# Patient Record
(demographics unavailable — no encounter records)

---

## 2024-10-18 NOTE — REVIEW OF SYSTEMS
Report called Maryanne CAMACHO   [Depression] : depression [Anxiety] : anxiety [Negative] : Heme/Lymph

## 2024-10-21 NOTE — PROCEDURE
[Complete Heart Block] : complete heart block [See Scanned Paceart Report] : See scanned paceart report [See Device Printout] : See device printout [CRT-D] : Cardiac resynchronization therapy defibrillator [DDDR] : DDDR [Voltage: ___ volts] : Voltage was [unfilled] volts [Longevity: ___ months] : The estimated remaining battery life is [unfilled] months [Threshold Testing Performed] : Threshold testing was performed [Sensing Amplitude ___mv] : sensing amplitude was [unfilled] mv [Lead Imp:  ___ohms] : lead impedance was [unfilled] ohms [___V @] : [unfilled] V [___ ms] : [unfilled] ms [Programmed for Longevity] : output reprogrammed for improved battery longevity [Counters Reset] : the counters were reset [de-identified] : All therapies programmed B>AX, changed to VVIR and atrial sensitivity turned OFF [VVIR] : VVIR [de-identified] : persistent AF [de-identified] : Medtronic [de-identified] : Cobalt XT Quad  [de-identified] : [id number] [de-identified] : 7/29/2021  [de-identified] : 80 [de-identified] : 4.4 years [de-identified] :  84.6% (81.4% effective CRT) Optivol below the threshold. 102 NSVT episodes lasting 1-2 seconds with HR ranging 182-286 bpm. The patient is enrolled on remote monitoring and transmitting.

## 2024-10-21 NOTE — PHYSICAL EXAM
[Normal Appearance] : normal appearance [General Appearance - In No Acute Distress] : no acute distress [] : no respiratory distress [Respiration, Rhythm And Depth] : normal respiratory rhythm and effort [Exaggerated Use Of Accessory Muscles For Inspiration] : no accessory muscle use [Left Infraclavicular] : left infraclavicular area [Clean] : clean [Dry] : dry [Well-Healed] : well-healed [Nail Clubbing] : no clubbing of the fingernails [Cyanosis, Localized] : no localized cyanosis [Petechial Hemorrhages (___cm)] : no petechial hemorrhages [FreeTextEntry1] : irregularly irregular

## 2024-10-21 NOTE — CARDIOLOGY SUMMARY
[de-identified] : 10/16/2024: A fib with RVR at 104 bpm, PVCs,  [de-identified] : ICD (MDT) implanted 7/29/2021

## 2024-10-21 NOTE — PROCEDURE
[Complete Heart Block] : complete heart block [See Scanned Paceart Report] : See scanned paceart report [See Device Printout] : See device printout [CRT-D] : Cardiac resynchronization therapy defibrillator [DDDR] : DDDR [Voltage: ___ volts] : Voltage was [unfilled] volts [Longevity: ___ months] : The estimated remaining battery life is [unfilled] months [Threshold Testing Performed] : Threshold testing was performed [Sensing Amplitude ___mv] : sensing amplitude was [unfilled] mv [Lead Imp:  ___ohms] : lead impedance was [unfilled] ohms [___V @] : [unfilled] V [___ ms] : [unfilled] ms [Programmed for Longevity] : output reprogrammed for improved battery longevity [Counters Reset] : the counters were reset [de-identified] : All therapies programmed B>AX, changed to VVIR and atrial sensitivity turned OFF [VVIR] : VVIR [de-identified] : persistent AF [de-identified] : Medtronic [de-identified] : Cobalt XT Quad  [de-identified] : [id number] [de-identified] : 7/29/2021  [de-identified] : 80 [de-identified] : 4.4 years [de-identified] :  84.6% (81.4% effective CRT) Optivol below the threshold. 102 NSVT episodes lasting 1-2 seconds with HR ranging 182-286 bpm. The patient is enrolled on remote monitoring and transmitting.

## 2024-10-21 NOTE — HISTORY OF PRESENT ILLNESS
[Palpitations] : no palpitations [SOB] : no dyspnea [Syncope] : no syncope [Dizziness] : no dizziness [Chest Pain] : no chest pain or discomfort [ICD Shocks] : no recent ICD shocks [Shoulder Pain] : no shoulder pain [Pain at Site] : no pain at device site [Erythema at Site] : no erythema at device site [Swelling at Site] : no swelling at device site [de-identified] : hypertension, DM, obesity, nonischemic cardiomyopathy chronic systolic heart failure NYHA III HIV, asthma s/p ICD implant by Dr. Carvalho in [place] in 2017 prior asthma reaction to beta-blockers chronic atrial fibrillation on warfarin, ( Jantoven) BiV upgrade and AV edmund ablation on 7/29/2021 10/22/2021: VT treated with ATP 11/14/2022: November 2021: VT that degenerated to VF terminated with ICD shock. (Mexiletine added) 2/19/2022: VT treated with ATP 3/14/2022: feet swelling; optivol accumulation on device check 4/14/2022 to 8/7/2022: no ICD shocks; 6 episodes of VT treated with ATP x1 9/12/2022: hospitalized at [facility] in July for fluid overload; seeing heart failure at [facility]; being evaluated from transplant and LVAD; had RHC 3/30/2023: hospitalized at [facility] for diarrhea - seeing Dr. Torres and Dr. Ashby 6/12/2023: No events - Last ATP 9/22/2022 10/12/2023: no events on ICD 4/15/2024: 1 VT event treated with ATP on 2/14/2024. No associated symptoms.  10/16/2024:  The patient denies chest pain/discomfort, dyspnea, palpitations, dizziness, lightheadedness and syncope.  She presents today for routine device interrogation.   ----------------------------------------------------------- Referring Cardiologist: Dr. German Torres Referring Heart Failure: Dr. Kaia Bravo Heart Failure [place]: Dr. Fang Bowers -----------------------------------------------------------   CARDIAC TESTING: ECG (6/23/2023) Atrial Fibrillation, BiV paced at 81 bpm ECG (9/12/2022) Atrial Fibrillation, BiV paced at 85 bpm ECG (3/14/2022): Atrial paced BiV paced at 83 bpm ECG (12/13/2021): Atrial paced BiV paced at 80 bpm ECG (4/19/2021): Atrial Fibrillation at 83 bpm, q inferior leads, precordial qs ECG ( 1/4/2021) no ecg done ECG ( 4/1/2019) atrial fib at 91bpm  QRS 94ms , poor R wave progression ECG (10/1//2018): Atrial fibrillation at 84 bpm, QRS 96, poor R wave progression, q waves inferior and anteroseptal leads. ECG (4/30/2018): Atrial fibrillation at 71 bpm, QRS 98, poor R wave progression, q waves inferior and anteroseptal leads. ECG (10/30/2017): Atrial fibrillation at 81 bpm, QRS 98, poor R wave progression, q waves inferior and anteroseptal leads. ECG (7/24/2017): Atrial fibrillation at 58 bpm, PVC, poor R wave progression, q waves inferior and anteroseptal leads. Echo (12/6/2016): LV moderately dialted, mild LVH, LVEF 20-25%, Grade 2 diastolic, LA dilated, Mod AI Cardiac Cath (6/15/2016): normal coronaries, mod elevated LVEDP Cardiac MRI (10/14/2016) LVEF 18%, RVEF 24%, moderate AI, mild MR

## 2024-10-21 NOTE — CARDIOLOGY SUMMARY
[de-identified] : 10/16/2024: A fib with RVR at 104 bpm, PVCs,  [de-identified] : ICD (MDT) implanted 7/29/2021

## 2024-10-21 NOTE — HISTORY OF PRESENT ILLNESS
[Palpitations] : no palpitations [SOB] : no dyspnea [Syncope] : no syncope [Dizziness] : no dizziness [Chest Pain] : no chest pain or discomfort [ICD Shocks] : no recent ICD shocks [Shoulder Pain] : no shoulder pain [Pain at Site] : no pain at device site [Erythema at Site] : no erythema at device site [Swelling at Site] : no swelling at device site [de-identified] : hypertension, DM, obesity, nonischemic cardiomyopathy chronic systolic heart failure NYHA III HIV, asthma s/p ICD implant by Dr. Carvalho in [place] in 2017 prior asthma reaction to beta-blockers chronic atrial fibrillation on warfarin, ( Jantoven) BiV upgrade and AV edmund ablation on 7/29/2021 10/22/2021: VT treated with ATP 11/14/2022: November 2021: VT that degenerated to VF terminated with ICD shock. (Mexiletine added) 2/19/2022: VT treated with ATP 3/14/2022: feet swelling; optivol accumulation on device check 4/14/2022 to 8/7/2022: no ICD shocks; 6 episodes of VT treated with ATP x1 9/12/2022: hospitalized at [facility] in July for fluid overload; seeing heart failure at [facility]; being evaluated from transplant and LVAD; had RHC 3/30/2023: hospitalized at [facility] for diarrhea - seeing Dr. Torres and Dr. Ashby 6/12/2023: No events - Last ATP 9/22/2022 10/12/2023: no events on ICD 4/15/2024: 1 VT event treated with ATP on 2/14/2024. No associated symptoms.  10/16/2024:  The patient denies chest pain/discomfort, dyspnea, palpitations, dizziness, lightheadedness and syncope.  She presents today for routine device interrogation.   ----------------------------------------------------------- Referring Cardiologist: Dr. German Torres Referring Heart Failure: Dr. Kaia Bravo Heart Failure [facility]: Dr. Fang Bowers -----------------------------------------------------------   CARDIAC TESTING: ECG (6/23/2023) Atrial Fibrillation, BiV paced at 81 bpm ECG (9/12/2022) Atrial Fibrillation, BiV paced at 85 bpm ECG (3/14/2022): Atrial paced BiV paced at 83 bpm ECG (12/13/2021): Atrial paced BiV paced at 80 bpm ECG (4/19/2021): Atrial Fibrillation at 83 bpm, q inferior leads, precordial qs ECG ( 1/4/2021) no ecg done ECG ( 4/1/2019) atrial fib at 91bpm  QRS 94ms , poor R wave progression ECG (10/1//2018): Atrial fibrillation at 84 bpm, QRS 96, poor R wave progression, q waves inferior and anteroseptal leads. ECG (4/30/2018): Atrial fibrillation at 71 bpm, QRS 98, poor R wave progression, q waves inferior and anteroseptal leads. ECG (10/30/2017): Atrial fibrillation at 81 bpm, QRS 98, poor R wave progression, q waves inferior and anteroseptal leads. ECG (7/24/2017): Atrial fibrillation at 58 bpm, PVC, poor R wave progression, q waves inferior and anteroseptal leads. Echo (12/6/2016): LV moderately dialted, mild LVH, LVEF 20-25%, Grade 2 diastolic, LA dilated, Mod AI Cardiac Cath (6/15/2016): normal coronaries, mod elevated LVEDP Cardiac MRI (10/14/2016) LVEF 18%, RVEF 24%, moderate AI, mild MR

## 2024-10-21 NOTE — HISTORY OF PRESENT ILLNESS
[Palpitations] : no palpitations [SOB] : no dyspnea [Syncope] : no syncope [Dizziness] : no dizziness [Chest Pain] : no chest pain or discomfort [ICD Shocks] : no recent ICD shocks [Shoulder Pain] : no shoulder pain [Pain at Site] : no pain at device site [Erythema at Site] : no erythema at device site [Swelling at Site] : no swelling at device site [de-identified] : hypertension, DM, obesity, nonischemic cardiomyopathy chronic systolic heart failure NYHA III HIV, asthma s/p ICD implant by Dr. Carvalho in [place] in 2017 prior asthma reaction to beta-blockers chronic atrial fibrillation on warfarin, ( Jantoven) BiV upgrade and AV edmund ablation on 7/29/2021 10/22/2021: VT treated with ATP 11/14/2022: November 2021: VT that degenerated to VF terminated with ICD shock. (Mexiletine added) 2/19/2022: VT treated with ATP 3/14/2022: feet swelling; optivol accumulation on device check 4/14/2022 to 8/7/2022: no ICD shocks; 6 episodes of VT treated with ATP x1 9/12/2022: hospitalized at [facility] in July for fluid overload; seeing heart failure at [facility]; being evaluated from transplant and LVAD; had RHC 3/30/2023: hospitalized at [facility] for diarrhea - seeing Dr. Torres and Dr. Ashby 6/12/2023: No events - Last ATP 9/22/2022 10/12/2023: no events on ICD 4/15/2024: 1 VT event treated with ATP on 2/14/2024. No associated symptoms.  10/16/2024:  The patient denies chest pain/discomfort, dyspnea, palpitations, dizziness, lightheadedness and syncope.  She presents today for routine device interrogation.   ----------------------------------------------------------- Referring Cardiologist: Dr. German Torres Referring Heart Failure: Dr. Kaia Bravo Heart Failure [facility]: Dr. Fang Bowers -----------------------------------------------------------   CARDIAC TESTING: ECG (6/23/2023) Atrial Fibrillation, BiV paced at 81 bpm ECG (9/12/2022) Atrial Fibrillation, BiV paced at 85 bpm ECG (3/14/2022): Atrial paced BiV paced at 83 bpm ECG (12/13/2021): Atrial paced BiV paced at 80 bpm ECG (4/19/2021): Atrial Fibrillation at 83 bpm, q inferior leads, precordial qs ECG ( 1/4/2021) no ecg done ECG ( 4/1/2019) atrial fib at 91bpm  QRS 94ms , poor R wave progression ECG (10/1//2018): Atrial fibrillation at 84 bpm, QRS 96, poor R wave progression, q waves inferior and anteroseptal leads. ECG (4/30/2018): Atrial fibrillation at 71 bpm, QRS 98, poor R wave progression, q waves inferior and anteroseptal leads. ECG (10/30/2017): Atrial fibrillation at 81 bpm, QRS 98, poor R wave progression, q waves inferior and anteroseptal leads. ECG (7/24/2017): Atrial fibrillation at 58 bpm, PVC, poor R wave progression, q waves inferior and anteroseptal leads. Echo (12/6/2016): LV moderately dialted, mild LVH, LVEF 20-25%, Grade 2 diastolic, LA dilated, Mod AI Cardiac Cath (6/15/2016): normal coronaries, mod elevated LVEDP Cardiac MRI (10/14/2016) LVEF 18%, RVEF 24%, moderate AI, mild MR

## 2024-10-21 NOTE — DISCUSSION/SUMMARY
[FreeTextEntry1] : Ms. 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 The treatment options for atrial fibrillation were discussed with the patient including catheter ablation of AF. Patient is not interested in AF ablation. Patient is allergic to betablockers and had severe reaction to multiple different betablockers. She was on Digoxin until June 2021, stopped due to polymorphic VT. Patient is on Cardizem for rate control, which is suboptimal in view of her low EF. She opted to have upgrade to BiV-ICD and AV edmund ablation for rate control, to avoid ICD shocks, and to allow stopping Cardizem and Digoxin. She underwent on 7/29/2021 an upgrade to biventricular defibrillator for the secondary prevention of sudden cardiac death, and AV edmund ablation. Patient had VT degenerated in VF in November 2021 leading to ICD shocks. Patient has ATP for VT on 2/19/2022. Then between 4/14/2022, 8/7/2022: 6 VT for ATPs. 9/22/2022 VT treated with ATP.  Device interrogation normal. I interrogated and reprogrammed the device as described above.   84.6% (81.4% effective CRT) Optivol below the threshold. 102 NSVT episodes lasting 1-2 seconds with HR ranging 182-286 bpm. Reviewed with Dr. Galvan and will continue to monitor. The patient is enrolled on remote monitoring and transmitting.  Patient cannot recall symptoms. She suffers of anxiety. Patient cannot tolerate beta blocker due to asthma, no CCB due to HF. Off Mexiletene because patient felt presyncopal while taking it. Amiodarone likely caused drug-induced liver injury. Will continue to monitor for now. If more episodes, can consider sotalol however baseline QTc 450ms (corrected). Alerts on MDT device changed to alert for ONE ATP episode delivered.   Chronic AF - device on VVIR mode to conserve battery.   Her blood pressure is well controlled. I recommend continuing the same medications.  Continue Jantoven. No s/s bleeding reported. Monitored at coumadin clinic.   Follow-up in 6 months or prn.   I have also advised the patient to go to the nearest emergency room if she experiences any chest pain, dyspnea, syncope, or has any other compelling symptoms.

## 2024-10-21 NOTE — PROCEDURE
[Complete Heart Block] : complete heart block [See Scanned Paceart Report] : See scanned paceart report [See Device Printout] : See device printout [CRT-D] : Cardiac resynchronization therapy defibrillator [DDDR] : DDDR [Voltage: ___ volts] : Voltage was [unfilled] volts [Longevity: ___ months] : The estimated remaining battery life is [unfilled] months [Threshold Testing Performed] : Threshold testing was performed [Sensing Amplitude ___mv] : sensing amplitude was [unfilled] mv [Lead Imp:  ___ohms] : lead impedance was [unfilled] ohms [___V @] : [unfilled] V [___ ms] : [unfilled] ms [Programmed for Longevity] : output reprogrammed for improved battery longevity [Counters Reset] : the counters were reset [de-identified] : All therapies programmed B>AX, changed to VVIR and atrial sensitivity turned OFF [VVIR] : VVIR [de-identified] : persistent AF [de-identified] : Medtronic [de-identified] : Cobalt XT Quad  [de-identified] : [id number] [de-identified] : 7/29/2021  [de-identified] : 80 [de-identified] : 4.4 years [de-identified] :  84.6% (81.4% effective CRT) Optivol below the threshold. 102 NSVT episodes lasting 1-2 seconds with HR ranging 182-286 bpm. The patient is enrolled on remote monitoring and transmitting.

## 2024-10-21 NOTE — CARDIOLOGY SUMMARY
[de-identified] : 10/16/2024: A fib with RVR at 104 bpm, PVCs,  [de-identified] : ICD (MDT) implanted 7/29/2021

## 2025-04-21 NOTE — ASSESSMENT
[FreeTextEntry1] : # NIDCM, CHB - Interrogation as above. Device reprogrammed as above.  - Patient denies any cardiovascular complaints.  - Optivol stable. No clinical signs/symptoms of heart failure.  - Remote monitor is set up and patient is transmitting.   # Persistent AF s/p AVN ablation and CRT-D upgrade (7/2021) - H/o ICD shocks. Unable to tolerate BB due to asthma. No CCB due to HF.   # VT  - Off Mexiletine because patient felt presyncopal while taking it. Amiodarone likely caused drug-induced liver injury.  - Will continue to monitor for now. If more episodes, can consider sotalol however baseline QTc 450ms (corrected). Alerts on MDT device were prev changed to alert for ONE ATP episode delivered.  # HTN - BP well controlled - Cont Entresto 24/26 mg BID - 2g Na diet enforced  I have also advised the patient to go to the nearest emergency room if she experiences any chest pain, dyspnea, syncope, or has any other compelling symptoms.  Follow up in 6-9 mo or sooner as needed

## 2025-04-21 NOTE — PHYSICAL EXAM
[Normal Appearance] : normal appearance [Well Groomed] : well groomed [General Appearance - In No Acute Distress] : no acute distress [] : no respiratory distress [Respiration, Rhythm And Depth] : normal respiratory rhythm and effort [Exaggerated Use Of Accessory Muscles For Inspiration] : no accessory muscle use [Auscultation Breath Sounds / Voice Sounds] : lungs were clear to auscultation bilaterally [Left Infraclavicular] : left infraclavicular area [Clean] : clean [Dry] : dry [Well-Healed] : well-healed [Nail Clubbing] : no clubbing of the fingernails [General Appearance - Well Developed] : well developed [General Appearance - Well Nourished] : well nourished [No Deformities] : no deformities [FreeTextEntry1] : irregularly irregular rate and rhythm [Abdomen Soft] : soft

## 2025-04-21 NOTE — CARDIOLOGY SUMMARY
[de-identified] : 4/21/2025: AF with BiV paced (HR 84 bpm) 10/16/2024: A fib with RVR at 104 bpm, PVCs,  ECG (6/23/2023) Atrial Fibrillation, BiV paced at 81 bpm ECG (9/12/2022) Atrial Fibrillation, BiV paced at 85 bpm ECG (3/14/2022): Atrial paced BiV paced at 83 bpm ECG (12/13/2021): Atrial paced BiV paced at 80 bpm ECG (4/19/2021): Atrial Fibrillation at 83 bpm, q inferior leads, precordial qs ECG ( 1/4/2021) no ecg done ECG ( 4/1/2019) atrial fib at 91bpm  QRS 94ms , poor R wave progression ECG (10/1//2018): Atrial fibrillation at 84 bpm, QRS 96, poor R wave progression, q waves inferior and anteroseptal leads. ECG (4/30/2018): Atrial fibrillation at 71 bpm, QRS 98, poor R wave progression, q waves inferior and anteroseptal leads. ECG (10/30/2017): Atrial fibrillation at 81 bpm, QRS 98, poor R wave progression, q waves inferior and anteroseptal leads. ECG (7/24/2017): Atrial fibrillation at 58 bpm, PVC, poor R wave progression, q waves inferior and anteroseptal leads.  [de-identified] : ECHO 1/2022 EF 32% Mod RVE. Severe LAE (Vol Index 72.1 ml/m2). Mod BILL. Small pericardial effusion. Severe MR. Mod TR. Grade 3 DD.  Echo (12/6/2016): LV moderately dilated, mild LVH, LVEF 20-25%, Grade 2 diastolic, LA dilated, Mod AI [de-identified] : Cardiac MRI (10/14/2016) LVEF 18%, RVEF 24%, moderate AI, mild MR [de-identified] : ICD (MDT) implanted 7/29/2021  [de-identified] : Cardiac Cath (6/15/2016): normal coronaries, mod elevated LVEDP

## 2025-04-21 NOTE — PROCEDURE
[Complete Heart Block] : complete heart block [See Scanned Paceart Report] : See scanned paceart report [See Device Printout] : See device printout [CRT-D] : Cardiac resynchronization therapy defibrillator [VVIR] : VVIR [Normal] : The battery status is normal. [Threshold Testing Performed] : Threshold testing was performed [Sensing Amplitude ___mv] : sensing amplitude was [unfilled] mv [Lead Imp:  ___ohms] : lead impedance was [unfilled] ohms [___V @] : [unfilled] V [___ ms] : [unfilled] ms [Programmed for Longevity] : output reprogrammed for improved battery longevity [Counters Reset] : the counters were reset [de-identified] : Medtronic [de-identified] : Cobalt XT Quad  [de-identified] : [id number] [de-identified] : 7/29/2021  [de-identified] : 80 [de-identified] : 3.9 years [de-identified] : AP: // : 92.5% (effective: 91.7%) Multiple NSVT episodes longest lasting 5 seconds with avg. v-rate = 192-212 bpm.  OptiVol fluid WNL.  Transmitting on [facility].

## 2025-04-21 NOTE — HISTORY OF PRESENT ILLNESS
[Palpitations] : no palpitations [SOB] : no dyspnea [Syncope] : no syncope [Dizziness] : no dizziness [Chest Pain] : no chest pain or discomfort [ICD Shocks] : no recent ICD shocks [Shoulder Pain] : no shoulder pain [Pain at Site] : no pain at device site [Erythema at Site] : no erythema at device site [Swelling at Site] : no swelling at device site [de-identified] : Referring Cardiologist: Dr. German Torres Referring Heart Failure: Dr. Kaia Bravo Heart Failure [place]: Dr. Fang Bowers  67 yo F with HTN, DM, NIDCM, obesity, chronic systolic HF NYHA III, HIV, asthma, s/p ICD implant (Dr. 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 The treatment options for atrial fibrillation were previously discussed with the patient including catheter ablation of AF. Patient is not interested in AF ablation. She is allergic to betablockers and had severe reaction to multiple different BB. She was on Digoxin until June 2021, stopped due to polymorphic VT. Patient is on Cardizem for rate control, which is suboptimal in view of her low EF. She opted to have upgrade to BiV-ICD and AV edmund ablation for rate control, to avoid ICD shocks, and to allow stopping Cardizem and Digoxin. She underwent on 7/29/2021 an upgrade to biventricular defibrillator for the secondary prevention of sudden cardiac death, and AV edmund ablation. Patient had VT degenerated in VF in November 2021 leading to ICD shocks. Patient has ATP for VT on 2/19/2022. Then between 4/14/2022, 8/7/2022: 6 VT for ATPs. 9/22/2022 VT treated with ATP.  10/22/2021: VT treated with ATP 11/14/2022: November 2021: VT that degenerated to VF terminated with ICD shock. (Mexiletine added) 2/19/2022: VT treated with ATP 3/14/2022: feet swelling; optivol accumulation on device check 4/14/2022 to 8/7/2022: no ICD shocks; 6 episodes of VT treated with ATP x1 9/12/2022: hospitalized at [facility] in July for fluid overload; seeing heart failure at [facility]; being evaluated from transplant and LVAD; had RHC 3/30/2023: hospitalized at [facility] for diarrhea - seeing Dr. Torres and Dr. Ashby 6/12/2023: No events - Last ATP 9/22/2022 10/12/2023: no events on ICD 4/15/2024: 1 VT event treated with ATP on 2/14/2024. No associated symptoms.  10/16/2024:  The patient denies chest pain/discomfort, dyspnea, palpitations, dizziness, lightheadedness and syncope.  4/21/2025 Here for routine follow up visit. Feels well. Would like to switch care. Denies chest pain, palpitations, presyncope or syncope.